# Patient Record
Sex: MALE | Race: WHITE | Employment: FULL TIME | URBAN - METROPOLITAN AREA
[De-identification: names, ages, dates, MRNs, and addresses within clinical notes are randomized per-mention and may not be internally consistent; named-entity substitution may affect disease eponyms.]

---

## 2024-07-25 ENCOUNTER — HOSPITAL ENCOUNTER (EMERGENCY)
Age: 49
Discharge: HOME | End: 2024-07-25
Attending: STUDENT IN AN ORGANIZED HEALTH CARE EDUCATION/TRAINING PROGRAM

## 2024-07-25 VITALS
SYSTOLIC BLOOD PRESSURE: 115 MMHG | WEIGHT: 199.2 LBS | RESPIRATION RATE: 12 BRPM | HEIGHT: 72 IN | BODY MASS INDEX: 26.98 KG/M2 | DIASTOLIC BLOOD PRESSURE: 80 MMHG | HEART RATE: 102 BPM | OXYGEN SATURATION: 96 % | TEMPERATURE: 98.2 F

## 2024-07-25 DIAGNOSIS — F10.10 ALCOHOL ABUSE: ICD-10-CM

## 2024-07-25 DIAGNOSIS — R20.2 TINGLING OF BOTH FEET: ICD-10-CM

## 2024-07-25 DIAGNOSIS — H10.9 CONJUNCTIVITIS OF BOTH EYES, UNSPECIFIED CONJUNCTIVITIS TYPE: Primary | ICD-10-CM

## 2024-07-25 DIAGNOSIS — L03.213 PRESEPTAL CELLULITIS: ICD-10-CM

## 2024-07-25 LAB
ALBUMIN SERPL-MCNC: 3.9 G/DL (ref 3.4–5)
ALBUMIN/GLOB SERPL: 1.5 {RATIO} (ref 1.1–2.2)
ALP SERPL-CCNC: 165 U/L (ref 40–129)
ALT SERPL-CCNC: 122 U/L (ref 10–40)
ANION GAP SERPL CALCULATED.3IONS-SCNC: 13 MMOL/L (ref 3–16)
AST SERPL-CCNC: 445 U/L (ref 15–37)
BASOPHILS # BLD: 0.1 K/UL (ref 0–0.2)
BASOPHILS NFR BLD: 1.6 %
BILIRUB SERPL-MCNC: 0.9 MG/DL (ref 0–1)
BUN SERPL-MCNC: 9 MG/DL (ref 7–20)
CALCIUM SERPL-MCNC: 8.8 MG/DL (ref 8.3–10.6)
CHLORIDE SERPL-SCNC: 106 MMOL/L (ref 99–110)
CO2 SERPL-SCNC: 24 MMOL/L (ref 21–32)
CREAT SERPL-MCNC: 0.6 MG/DL (ref 0.9–1.3)
DEPRECATED RDW RBC AUTO: 15 % (ref 12.4–15.4)
EOSINOPHIL # BLD: 0.3 K/UL (ref 0–0.6)
EOSINOPHIL NFR BLD: 3.3 %
GFR SERPLBLD CREATININE-BSD FMLA CKD-EPI: >90 ML/MIN/{1.73_M2}
GLUCOSE SERPL-MCNC: 100 MG/DL (ref 70–99)
HCT VFR BLD AUTO: 42.3 % (ref 40.5–52.5)
HGB BLD-MCNC: 14.4 G/DL (ref 13.5–17.5)
LYMPHOCYTES # BLD: 2 K/UL (ref 1–5.1)
LYMPHOCYTES NFR BLD: 26.9 %
MCH RBC QN AUTO: 33.4 PG (ref 26–34)
MCHC RBC AUTO-ENTMCNC: 34.2 G/DL (ref 31–36)
MCV RBC AUTO: 97.7 FL (ref 80–100)
MONOCYTES # BLD: 1 K/UL (ref 0–1.3)
MONOCYTES NFR BLD: 12.9 %
NEUTROPHILS # BLD: 4.2 K/UL (ref 1.7–7.7)
NEUTROPHILS NFR BLD: 55.3 %
PLATELET # BLD AUTO: 236 K/UL (ref 135–450)
PMV BLD AUTO: 6.7 FL (ref 5–10.5)
POTASSIUM SERPL-SCNC: 4.3 MMOL/L (ref 3.5–5.1)
PROT SERPL-MCNC: 6.5 G/DL (ref 6.4–8.2)
RBC # BLD AUTO: 4.32 M/UL (ref 4.2–5.9)
SODIUM SERPL-SCNC: 143 MMOL/L (ref 136–145)
TSH SERPL DL<=0.005 MIU/L-ACNC: 0.63 UIU/ML (ref 0.27–4.2)
WBC # BLD AUTO: 7.6 K/UL (ref 4–11)

## 2024-07-25 PROCEDURE — 83036 HEMOGLOBIN GLYCOSYLATED A1C: CPT

## 2024-07-25 PROCEDURE — 82607 VITAMIN B-12: CPT

## 2024-07-25 PROCEDURE — 85025 COMPLETE CBC W/AUTO DIFF WBC: CPT

## 2024-07-25 PROCEDURE — 99283 EMERGENCY DEPT VISIT LOW MDM: CPT

## 2024-07-25 PROCEDURE — 80053 COMPREHEN METABOLIC PANEL: CPT

## 2024-07-25 PROCEDURE — 6370000000 HC RX 637 (ALT 250 FOR IP): Performed by: STUDENT IN AN ORGANIZED HEALTH CARE EDUCATION/TRAINING PROGRAM

## 2024-07-25 PROCEDURE — 84443 ASSAY THYROID STIM HORMONE: CPT

## 2024-07-25 RX ORDER — AMOXICILLIN AND CLAVULANATE POTASSIUM 875; 125 MG/1; MG/1
1 TABLET, FILM COATED ORAL ONCE
Status: COMPLETED | OUTPATIENT
Start: 2024-07-25 | End: 2024-07-25

## 2024-07-25 RX ORDER — MOXIFLOXACIN 5 MG/ML
1 SOLUTION/ DROPS OPHTHALMIC EVERY 6 HOURS
Qty: 3 ML | Refills: 0 | Status: SHIPPED | OUTPATIENT
Start: 2024-07-25 | End: 2024-08-01

## 2024-07-25 RX ORDER — AMOXICILLIN AND CLAVULANATE POTASSIUM 875; 125 MG/1; MG/1
1 TABLET, FILM COATED ORAL 2 TIMES DAILY
Qty: 14 TABLET | Refills: 0 | Status: SHIPPED | OUTPATIENT
Start: 2024-07-25 | End: 2024-08-01

## 2024-07-25 RX ADMIN — AMOXICILLIN AND CLAVULANATE POTASSIUM 1 TABLET: 875; 125 TABLET, FILM COATED ORAL at 16:51

## 2024-07-25 ASSESSMENT — ENCOUNTER SYMPTOMS
PHOTOPHOBIA: 0
EYE REDNESS: 1
TROUBLE SWALLOWING: 0
DIARRHEA: 0
COUGH: 0
NAUSEA: 0
SINUS PRESSURE: 0
EYE ITCHING: 1
SHORTNESS OF BREATH: 0
VOMITING: 0
FACIAL SWELLING: 1
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
## 2024-07-26 LAB
EST. AVERAGE GLUCOSE BLD GHB EST-MCNC: 99.7 MG/DL
HBA1C MFR BLD: 5.1 %
VIT B12 SERPL-MCNC: 780 PG/ML (ref 211–911)

## 2024-08-15 ENCOUNTER — OFFICE VISIT (OUTPATIENT)
Dept: INTERNAL MEDICINE CLINIC | Age: 49
End: 2024-08-15

## 2024-08-15 VITALS
HEIGHT: 72 IN | DIASTOLIC BLOOD PRESSURE: 88 MMHG | HEART RATE: 88 BPM | TEMPERATURE: 97.3 F | RESPIRATION RATE: 20 BRPM | BODY MASS INDEX: 26.78 KG/M2 | OXYGEN SATURATION: 96 % | SYSTOLIC BLOOD PRESSURE: 129 MMHG | WEIGHT: 197.7 LBS

## 2024-08-15 DIAGNOSIS — R55 MICTURITION SYNCOPE: ICD-10-CM

## 2024-08-15 DIAGNOSIS — R79.89 ELEVATED LFTS: ICD-10-CM

## 2024-08-15 DIAGNOSIS — F10.90 ALCOHOL USE DISORDER: Primary | ICD-10-CM

## 2024-08-15 DIAGNOSIS — F32.A MILD DEPRESSION: ICD-10-CM

## 2024-08-15 PROCEDURE — 99213 OFFICE O/P EST LOW 20 MIN: CPT

## 2024-08-15 ASSESSMENT — PATIENT HEALTH QUESTIONNAIRE - PHQ9
SUM OF ALL RESPONSES TO PHQ QUESTIONS 1-9: 2
SUM OF ALL RESPONSES TO PHQ QUESTIONS 1-9: 2
1. LITTLE INTEREST OR PLEASURE IN DOING THINGS: NOT AT ALL
SUM OF ALL RESPONSES TO PHQ QUESTIONS 1-9: 2
SUM OF ALL RESPONSES TO PHQ9 QUESTIONS 1 & 2: 2
2. FEELING DOWN, DEPRESSED OR HOPELESS: MORE THAN HALF THE DAYS
SUM OF ALL RESPONSES TO PHQ QUESTIONS 1-9: 2

## 2024-08-15 NOTE — PATIENT INSTRUCTIONS
If new symptoms arise and/or pre-existing ones worsen, please call 911 and go to the nearest emergency department.     Please follow up with the treatment resources provided below.     Substance use and alcohol treatment resources:  Below are a few of the resources available in the area and online that can help you on your journey of recovery from alcohol and/or drug use.     [facility] - offers comprehensive substance abuse and addiction treatment. Includes medical treatment/medication management, psychological therapy, and case management/social support services.   Takes most insurances  Call: [phone] or [phone]  [place]: [address]: [address] [place] [address]  [place]: [address] [place] [address]  [place]: [address] [facility], [place] [address]

## 2024-08-15 NOTE — PROGRESS NOTES
Pulmonary:      Effort: Pulmonary effort is normal.      Breath sounds: Normal breath sounds.   Abdominal:      General: Abdomen is flat. Bowel sounds are normal.      Palpations: Abdomen is soft.   Musculoskeletal:         General: Normal range of motion.   Neurological:      General: No focal deficit present.      Mental Status: He is alert and oriented to person, place, and time.   Psychiatric:      Comments: Tearful affect.         ASSESSMENT/PLAN:   #Alcohol use disorder  #Elevated LFTs  - Patient unable to quantify how much he drinks but he says that he used to drink \"a lot\" but now drinks \"a little\"  - On a recent hospital visit his AST was elevated to 445 while his ALT was elevated to 122 and his alkaline phosphatase was elevated to 165  -No gave the patient the number to [facility] when she has a substance use and alcohol treatment resources  - The patient used to be in AA which helped him cut down his alcohol use    #Micturition syncope  - The patient says that he used to have syncope whenever he would urinate but he started taking vitamin B12 supplements and since then has not had symptoms for the past 6 months  - Will monitor for now    #Mild depression  - He scored a 9 on the PHQ-9 scale indicating mild depression  -The patient was also requesting a paper which indicated that he is not physically fit to work  - A thorough physical did not reveal any physical disability and I think he would benefit from the resources that were provided to him to cut down his alcohol as this may be at least partly contributing to his symptoms   - He did have pain markings on his body and when I asked him where these were from he said he was 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

## 2024-09-19 ENCOUNTER — OFFICE VISIT (OUTPATIENT)
Dept: INTERNAL MEDICINE CLINIC | Age: 49
End: 2024-09-19

## 2024-09-19 VITALS
HEIGHT: 72 IN | RESPIRATION RATE: 16 BRPM | SYSTOLIC BLOOD PRESSURE: 122 MMHG | HEART RATE: 97 BPM | BODY MASS INDEX: 26.22 KG/M2 | OXYGEN SATURATION: 96 % | DIASTOLIC BLOOD PRESSURE: 86 MMHG | WEIGHT: 193.6 LBS | TEMPERATURE: 98.6 F

## 2024-09-19 DIAGNOSIS — R55 MICTURITION SYNCOPE: ICD-10-CM

## 2024-09-19 DIAGNOSIS — F32.A MILD DEPRESSION: ICD-10-CM

## 2024-09-19 DIAGNOSIS — E53.8 SUBACUTE COMBINED DEGENERATION (HCC): ICD-10-CM

## 2024-09-19 DIAGNOSIS — G32.0 SUBACUTE COMBINED DEGENERATION (HCC): ICD-10-CM

## 2024-09-19 DIAGNOSIS — R79.89 ELEVATED LFTS: ICD-10-CM

## 2024-09-19 DIAGNOSIS — F10.90 ALCOHOL USE DISORDER: Primary | ICD-10-CM

## 2024-09-19 PROCEDURE — 99213 OFFICE O/P EST LOW 20 MIN: CPT

## 2024-09-26 ENCOUNTER — OFFICE VISIT (OUTPATIENT)
Dept: INTERNAL MEDICINE CLINIC | Age: 49
End: 2024-09-26

## 2024-09-26 VITALS
WEIGHT: 194 LBS | OXYGEN SATURATION: 98 % | RESPIRATION RATE: 16 BRPM | HEIGHT: 72 IN | SYSTOLIC BLOOD PRESSURE: 115 MMHG | BODY MASS INDEX: 26.28 KG/M2 | DIASTOLIC BLOOD PRESSURE: 80 MMHG | TEMPERATURE: 97.5 F | HEART RATE: 91 BPM

## 2024-09-26 DIAGNOSIS — E51.2 WERNICKE'S ENCEPHALOPATHY: ICD-10-CM

## 2024-09-26 DIAGNOSIS — R79.89 ELEVATED LFTS: Primary | ICD-10-CM

## 2024-09-26 PROCEDURE — 99213 OFFICE O/P EST LOW 20 MIN: CPT

## 2024-09-26 RX ORDER — OMEGA-3S/DHA/EPA/FISH OIL/D3 300MG-1000
400 CAPSULE ORAL DAILY
COMMUNITY

## 2024-09-26 RX ORDER — VITAMIN B COMPLEX
1 CAPSULE ORAL DAILY
COMMUNITY

## 2024-09-26 ASSESSMENT — ENCOUNTER SYMPTOMS
ABDOMINAL PAIN: 0
COUGH: 0
VOMITING: 0
SHORTNESS OF BREATH: 0
DIARRHEA: 0
CONSTIPATION: 0
NAUSEA: 0
CHEST TIGHTNESS: 0

## 2024-12-20 ENCOUNTER — OFFICE VISIT (OUTPATIENT)
Dept: INTERNAL MEDICINE CLINIC | Age: 49
End: 2024-12-20

## 2024-12-20 VITALS
TEMPERATURE: 97.5 F | DIASTOLIC BLOOD PRESSURE: 79 MMHG | BODY MASS INDEX: 26.1 KG/M2 | SYSTOLIC BLOOD PRESSURE: 135 MMHG | WEIGHT: 192.7 LBS | OXYGEN SATURATION: 97 % | HEIGHT: 72 IN | HEART RATE: 122 BPM | RESPIRATION RATE: 14 BRPM

## 2024-12-20 DIAGNOSIS — E53.8 SUBACUTE COMBINED DEGENERATION (HCC): ICD-10-CM

## 2024-12-20 DIAGNOSIS — F10.90 ALCOHOL USE DISORDER: Primary | ICD-10-CM

## 2024-12-20 DIAGNOSIS — G32.0 SUBACUTE COMBINED DEGENERATION (HCC): ICD-10-CM

## 2024-12-20 DIAGNOSIS — E51.2 WERNICKE'S ENCEPHALOPATHY: ICD-10-CM

## 2024-12-20 DIAGNOSIS — R79.89 ELEVATED LFTS: ICD-10-CM

## 2024-12-20 PROCEDURE — 99213 OFFICE O/P EST LOW 20 MIN: CPT

## 2024-12-20 RX ORDER — MULTIVIT-MIN/IRON FUM/FOLIC AC 7.5 MG-4
1 TABLET ORAL DAILY
Qty: 30 TABLET | Refills: 3 | Status: SHIPPED | OUTPATIENT
Start: 2024-12-20

## 2024-12-20 ASSESSMENT — ENCOUNTER SYMPTOMS
ABDOMINAL PAIN: 0
SHORTNESS OF BREATH: 0
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
## 2024-12-20 NOTE — PATIENT INSTRUCTIONS
1. It was nice meeting you today.    2. PLEASE BRING IN THE DISABILITY PAPERWORK TO YOUR NEXT APPOINTMENT. You can get the paperwork at https://www.ssa.gov/disability.     3. I have referred you to the neurologist (brain doctor). Please make an appointment with them at your earliest convenience.    4. Please call and make an appointment with the liver doctor. [phone]    5. Please take a daily multivitamin.

## 2025-02-14 ENCOUNTER — OFFICE VISIT (OUTPATIENT)
Dept: INTERNAL MEDICINE CLINIC | Age: 50
End: 2025-02-14

## 2025-02-14 VITALS
TEMPERATURE: 98.1 F | DIASTOLIC BLOOD PRESSURE: 85 MMHG | HEART RATE: 93 BPM | HEIGHT: 72 IN | WEIGHT: 193.2 LBS | BODY MASS INDEX: 26.17 KG/M2 | SYSTOLIC BLOOD PRESSURE: 118 MMHG | OXYGEN SATURATION: 93 % | RESPIRATION RATE: 16 BRPM

## 2025-02-14 DIAGNOSIS — G32.0 SUBACUTE COMBINED DEGENERATION (HCC): ICD-10-CM

## 2025-02-14 DIAGNOSIS — R79.89 ELEVATED LFTS: ICD-10-CM

## 2025-02-14 DIAGNOSIS — F10.90 ALCOHOL USE DISORDER: ICD-10-CM

## 2025-02-14 DIAGNOSIS — E53.8 SUBACUTE COMBINED DEGENERATION (HCC): ICD-10-CM

## 2025-02-14 DIAGNOSIS — E51.2 WERNICKE'S ENCEPHALOPATHY: ICD-10-CM

## 2025-02-14 PROCEDURE — 99213 OFFICE O/P EST LOW 20 MIN: CPT

## 2025-02-14 RX ORDER — NALTREXONE HYDROCHLORIDE 50 MG/1
50 TABLET, FILM COATED ORAL DAILY
Qty: 30 TABLET | Refills: 1 | Status: SHIPPED | OUTPATIENT
Start: 2025-02-14 | End: 2025-04-15

## 2025-02-14 RX ORDER — THIAMINE MONONITRATE (VIT B1) 100 MG
100 TABLET ORAL DAILY
Qty: 30 TABLET | Refills: 3 | Status: SHIPPED | OUTPATIENT
Start: 2025-02-14

## 2025-02-14 RX ORDER — MULTIVIT-MIN/IRON FUM/FOLIC AC 7.5 MG-4
1 TABLET ORAL DAILY
Qty: 30 TABLET | Refills: 3 | Status: SHIPPED | OUTPATIENT
Start: 2025-02-14

## 2025-02-14 SDOH — ECONOMIC STABILITY: FOOD INSECURITY: WITHIN THE PAST 12 MONTHS, YOU WORRIED THAT YOUR FOOD WOULD RUN OUT BEFORE YOU GOT MONEY TO BUY MORE.: OFTEN TRUE

## 2025-02-14 SDOH — ECONOMIC STABILITY: FOOD INSECURITY: WITHIN THE PAST 12 MONTHS, THE FOOD YOU BOUGHT JUST DIDN'T LAST AND YOU DIDN'T HAVE MONEY TO GET MORE.: OFTEN TRUE

## 2025-02-14 ASSESSMENT — ENCOUNTER SYMPTOMS
ABDOMINAL PAIN: 0
SHORTNESS OF BREATH: 0

## 2025-02-14 ASSESSMENT — PATIENT HEALTH QUESTIONNAIRE - PHQ9: DEPRESSION UNABLE TO ASSESS: PT REFUSES

## 2025-02-14 NOTE — PATIENT INSTRUCTIONS
Thank you for visiting the [facility] outpatient clinic  Continue taking your medications as prescribed. You have been started on Thiamine another vitamin and naltrexone (depade) to help cut down alcohol use.  Exercise as tolerated at least 45 minutes 3X a week  Return to the clinic in 5 weeks  Call the office with any questions       [facility] Financial Resources*  (Call [employer]/[place] if need more resources.)      [employer] 211   Speak to a trained professional 24/7 who can connect you to essential community services including food, clothing, transportation, housing, utilities, employment services, childcare, and baby supplies. 211 serves nationwide.   [employer] for resources in [facility], [place] and [facility] in [place]; [place], [place], [place], and [facility] in [place].   [employer]/resources for resources in [facility], [place], [place], [place], [place], [place], [place], [facility], [place], [place], and [facility] in [place].     [facility] Financial Assistance  What they offer: Financial assistance programs that are designed to assist you in finding resources that may help pay your hospital bill. Please click on the links below to learn more about the financial assistance programs available within our regions.  Phone Number: [phone]  How to apply for the [facility] Financial Assistance Program:       Option 1: To apply for financial assistance, a patient (or their family or other provider) should fill out the Financial Assistance Application. Copies of the Financial Assistance Application and the FAP may be obtained for free by calling the [facility] Customer Service department at [phone]   Option 2: The Financial Assistance Application and policy may be obtained for free by downloading a copy from the [employer] website:  https://www.iRule/patient-resources/financial-assistance  [place] Health Care Assurance Program  What they offer:  Patients who need

## 2025-02-14 NOTE — PROGRESS NOTES
The [facility] Outpatient Internal Medicine Clinic    Phong Marie is a 49 y.o. male with history of alcohol use disorder, cognitive decline and micturition syncope who presents here for follow up.     Patient continues to have memory issues, most importantly, inability to recall most recent occurrences. Patient was evaluated at  ER  on 1/22/25 for his memory decline but did not have any recall of that. He endorsed occasional dizziness which previously had led to syncopal episodes. He denied no recent syncopal episode. He continues to drink alcohol but he reiterated his willingness to stop, including enrolling in a rehab center. He would want to work with social work to that effect.    Physical exam remarkable for tremors in upper extremities and unsteady gait when ambulating. His other vitals were however stable.    Review of Systems   Constitutional:  Negative for fever.   HENT:  Negative for congestion.    Respiratory:  Negative for shortness of breath.    Cardiovascular:  Negative for chest pain.   Gastrointestinal:  Negative for abdominal pain.   Genitourinary:  Negative for dysuria.   Musculoskeletal:  Negative for myalgias.   Skin:  Negative for wound.   Neurological:  Negative for headaches.   Psychiatric/Behavioral:  Positive for behavioral problems and confusion. The patient is nervous/anxious.        MEDICATIONS:  Prior to Visit Medications    Medication Sig Taking? Authorizing Provider   naltrexone (DEPADE) 50 MG tablet Take 1 tablet by mouth daily Yes Prince Jaimse MD   vitamin B-1 (THIAMINE) 100 MG tablet Take 1 tablet by mouth daily Yes Prince Jaimes MD   Multiple Vitamins-Minerals (MULTIVITAMIN WITH MINERALS) tablet Take 1 tablet by mouth daily Yes Prince Jaimes MD        Vitals:    02/14/25 1056   BP: 118/85   Site: Right Upper Arm   Position: Sitting   Cuff Size: Medium Adult   Pulse: 93   Resp: 16   Temp: 98.1 °F (36.7 °C)   TempSrc: Temporal   SpO2: 93%   Weight: 87.6

## 2025-03-19 ENCOUNTER — OFFICE VISIT (OUTPATIENT)
Dept: INTERNAL MEDICINE CLINIC | Age: 50
End: 2025-03-19

## 2025-03-19 VITALS
RESPIRATION RATE: 16 BRPM | OXYGEN SATURATION: 96 % | HEART RATE: 101 BPM | DIASTOLIC BLOOD PRESSURE: 91 MMHG | SYSTOLIC BLOOD PRESSURE: 135 MMHG | WEIGHT: 193.5 LBS | BODY MASS INDEX: 26.21 KG/M2 | HEIGHT: 72 IN | TEMPERATURE: 98.6 F

## 2025-03-19 DIAGNOSIS — E51.2 WERNICKE'S ENCEPHALOPATHY: Primary | ICD-10-CM

## 2025-03-19 DIAGNOSIS — E53.8 SUBACUTE COMBINED DEGENERATION (HCC): ICD-10-CM

## 2025-03-19 DIAGNOSIS — G32.0 SUBACUTE COMBINED DEGENERATION (HCC): ICD-10-CM

## 2025-03-19 DIAGNOSIS — F10.90 ALCOHOL USE DISORDER: ICD-10-CM

## 2025-03-19 PROCEDURE — 99213 OFFICE O/P EST LOW 20 MIN: CPT

## 2025-03-19 ASSESSMENT — PATIENT HEALTH QUESTIONNAIRE - PHQ9
8. MOVING OR SPEAKING SO SLOWLY THAT OTHER PEOPLE COULD HAVE NOTICED. OR THE OPPOSITE, BEING SO FIGETY OR RESTLESS THAT YOU HAVE BEEN MOVING AROUND A LOT MORE THAN USUAL: NOT AT ALL
SUM OF ALL RESPONSES TO PHQ QUESTIONS 1-9: 6
5. POOR APPETITE OR OVEREATING: SEVERAL DAYS
SUM OF ALL RESPONSES TO PHQ QUESTIONS 1-9: 6
4. FEELING TIRED OR HAVING LITTLE ENERGY: NOT AT ALL
9. THOUGHTS THAT YOU WOULD BE BETTER OFF DEAD, OR OF HURTING YOURSELF: NOT AT ALL
3. TROUBLE FALLING OR STAYING ASLEEP: SEVERAL DAYS
6. FEELING BAD ABOUT YOURSELF - OR THAT YOU ARE A FAILURE OR HAVE LET YOURSELF OR YOUR FAMILY DOWN: SEVERAL DAYS
10. IF YOU CHECKED OFF ANY PROBLEMS, HOW DIFFICULT HAVE THESE PROBLEMS MADE IT FOR YOU TO DO YOUR WORK, TAKE CARE OF THINGS AT HOME, OR GET ALONG WITH OTHER PEOPLE: SOMEWHAT DIFFICULT
1. LITTLE INTEREST OR PLEASURE IN DOING THINGS: NOT AT ALL
2. FEELING DOWN, DEPRESSED OR HOPELESS: NEARLY EVERY DAY
7. TROUBLE CONCENTRATING ON THINGS, SUCH AS READING THE NEWSPAPER OR WATCHING TELEVISION: NOT AT ALL
SUM OF ALL RESPONSES TO PHQ QUESTIONS 1-9: 6
SUM OF ALL RESPONSES TO PHQ QUESTIONS 1-9: 6

## 2025-03-19 ASSESSMENT — ENCOUNTER SYMPTOMS
ABDOMINAL PAIN: 0
SHORTNESS OF BREATH: 0

## 2025-03-19 NOTE — PROGRESS NOTES
The [facility] Outpatient Internal Medicine Clinic    Phong Marie is a 49 y.o. male with history of alcohol use disorder, cognitive decline and micturition syncope who presents here for follow up.     Patient continues to have memory issues, and believes symptoms are getting worse,  most importantly, inability to recall most recent occurrences. He forgot to  his prescription and get the labs. He denied no recent syncopal episode. He continues to drink alcohol with his father. I spoke with father on the phone on the necessity of patient to stop drinking and not to drive or operate any machine  that requires focus and attention. He would want to work with social work to that effect, referral was sent,.     Physical exam remarkable for tremors in upper extremities and unsteady gait when ambulating. His other vitals were however stable.    Review of Systems   Constitutional:  Negative for fever.   HENT:  Negative for congestion.    Respiratory:  Negative for shortness of breath.    Cardiovascular:  Negative for chest pain.   Gastrointestinal:  Negative for abdominal pain.   Genitourinary:  Negative for dysuria.   Musculoskeletal:  Negative for myalgias.   Skin:  Negative for wound.   Neurological:  Negative for headaches.   Psychiatric/Behavioral:  Positive for behavioral problems and confusion. The patient is nervous/anxious.        MEDICATIONS:  Prior to Visit Medications    Medication Sig Taking? Authorizing Provider   naltrexone (DEPADE) 50 MG tablet Take 1 tablet by mouth daily  Patient not taking: Reported on 3/19/2025  Prince Jaimes MD   vitamin B-1 (THIAMINE) 100 MG tablet Take 1 tablet by mouth daily  Patient not taking: Reported on 3/19/2025  Prince Jaimes MD   Multiple Vitamins-Minerals (MULTIVITAMIN WITH MINERALS) tablet Take 1 tablet by mouth daily  Patient not taking: Reported on 3/19/2025  Prince Jaimes MD        Vitals:    03/19/25 0809 03/19/25 0816   BP: (!) 136/98 (!)

## 2025-03-19 NOTE — PATIENT INSTRUCTIONS
Thank you for visiting the [facility] outpatient clinic  PLEASE PICK YOUR MEDICATIONS FROM THE PHARMACY AND START TAKING THEM.  GET YOUR LABS DONE AS SOON TODAY  YOU ARE NOT SUPPOSED TO DRIVE OR OPERATE ANY MACHINES UNTIL PERMITTED BY YOUR DOCTOR.  STOP DRINKING ALCOHOL  Exercise as tolerated at least 45 minutes 3X a week  Return to the clinic in 5 WEEKS  Call the office with any questions       [facility] Financial Resources*  (Call United Way/[employer] if need more resources.)      [employer] 211   Speak to a trained professional 24/7 who can connect you to essential community services including food, clothing, transportation, housing, utilities, employment services, childcare, and baby supplies. 211 serves nationwide.   [employer] for resources in [facility], [place] and [facility] in [place]; [place], [place], [place], and [facility] in [place].   [employer]/resources for resources in hospitals, [place], [place], [place], [place], [place], [place], [facility], [place], [place], and [facility] in [place].     [facility] [employer] Financial Assistance  What they offer: Financial assistance programs that are designed to assist you in finding resources that may help pay your hospital bill. Please click on the links below to learn more about the financial assistance programs available within our regions.  Phone Number: [phone]  How to apply for the [facility] [place] Financial Assistance Program:       Option 1: To apply for financial assistance, a patient (or their family or other provider) should fill out the Financial Assistance Application. Copies of the Financial Assistance Application and the FAP may be obtained for free by calling the [facility] [place] Customer Service department at [phone]   Option 2: The Financial Assistance Application and policy may be obtained for free by downloading a copy from the [employer] website:  https://www.extraTKT/patient-resources/financial-assistance  [place]

## 2025-03-20 ENCOUNTER — COMMUNITY OUTREACH (OUTPATIENT)
Dept: OTHER | Age: 50
End: 2025-03-20

## 2025-03-20 NOTE — PROGRESS NOTES
[facility]      CHW Tima Zuniga spoke with Mr. Marie to assess for possible needs. He asked for further assistance with different services.     CHW let him know that Max Rosenberg will be contacting him next week..      Follow up:     CHW Max Moreno will follow-up next week.

## 2025-03-31 ENCOUNTER — TELEPHONE (OUTPATIENT)
Dept: OTHER | Age: 50
End: 2025-03-31

## 2025-03-31 NOTE — TELEPHONE ENCOUNTER
[facility]-CHW     Attempted to contact patient regarding CHW referral and need for community resources. CHW left message with call back info and will make second attempt to contact in one week

## 2025-04-02 ENCOUNTER — COMMUNITY OUTREACH (OUTPATIENT)
Dept: OTHER | Age: 50
End: 2025-04-02

## 2025-04-02 NOTE — PROGRESS NOTES
[facility]-CHW    Received call back from patient regarding CHW referral and need for community resources. CHW asked patient what barriers does he think he is facing and might need help addressing. Patient was unsure but feels like he needs social work or social advocate intervention to help figure out what he needs. CHW explained to him role of CHW and how he can help. CHW asked patient if he was working or had any income. Patient stated he has not worked in awhile due medical conditions he has and has no income and lives with father. CHW asked patient has he applied for any benefits like SNAP or disability due to not being able to work. Patient stated no and when he does receive applications for benefits he loses applications next day. CHW let patient know he can help him with applications and can follow up with him to make sure he submitted them. Patient stated again he feels like he needs someone to assess him. CHW let patient know he will follow up with him and will see about scheduling a meeting to go over resources and possible needs.

## 2025-04-23 ENCOUNTER — TELEPHONE (OUTPATIENT)
Dept: INTERNAL MEDICINE CLINIC | Age: 50
End: 2025-04-23